# Patient Record
(demographics unavailable — no encounter records)

---

## 2025-04-27 NOTE — ASSESSMENT
[FreeTextEntry1] : Discussed about etiology of anemia during pregnancy including physiological, iron deficiency, hemolysis etc Discussed at length about iron deficiency- etiology, signs and symptoms, complications, and management options: oral vs IV Iron Symptoms: fatigue    I have reviewed the risks, benefits and side effects of IV iron with the patient to include but are not limited to infusion reaction, headaches, nausea. All questions were answered to satisfaction.   Patient agrees to pursue IV iron.   >CBC, iron studies, ferritin, B12 >Schedule IV venofer 200 mg x 5. >Repeat blood work including CBC, ferritin, iron studies in 5-6 weeks. >Further IV iron PRN for ferritin < 50

## 2025-04-27 NOTE — HISTORY OF PRESENT ILLNESS
[de-identified] : This is a 35 yo pregnant female coming to heme clinic for SINGH.  Pt is 30 weeks pregnant- VINAY 25.  Has been on oral iron since early pregnancy- kalia well.   Pertinent labs from 25 Ferritin- 17 (was 24 on 24) HGB 9.7 HCT 28.6 RBC 3.23  - first birth uncomplicated.  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

## 2025-04-27 NOTE — REASON FOR VISIT
[Initial Consultation] : an initial consultation for [FreeTextEntry2] : Anemia  [Interpreters_IDNumber] : [id number] [Interpreters_FullName] : Tami  [TWNoteComboBox1] : Congolese

## 2025-04-27 NOTE — PHYSICAL EXAM
[Fully active, able to carry on all pre-disease performance without restriction] : Status 0 - Fully active, able to carry on all pre-disease performance without restriction [Normal] : affect appropriate [de-identified] : _+gravid

## 2025-04-27 NOTE — PHYSICAL EXAM
[Fully active, able to carry on all pre-disease performance without restriction] : Status 0 - Fully active, able to carry on all pre-disease performance without restriction [Normal] : affect appropriate [de-identified] : _+gravid

## 2025-04-27 NOTE — PHYSICAL EXAM
[Fully active, able to carry on all pre-disease performance without restriction] : Status 0 - Fully active, able to carry on all pre-disease performance without restriction [Normal] : affect appropriate [de-identified] : _+gravid

## 2025-04-27 NOTE — REASON FOR VISIT
[Initial Consultation] : an initial consultation for [FreeTextEntry2] : Anemia  [Interpreters_IDNumber] : [id number] [Interpreters_FullName] : Tami  [TWNoteComboBox1] : Swedish

## 2025-04-27 NOTE — HISTORY OF PRESENT ILLNESS
[de-identified] : This is a 35 yo pregnant female coming to heme clinic for SINGH.  Pt is 30 weeks pregnant- VINAY 25.  Has been on oral iron since early pregnancy- kalia well.   Pertinent labs from 25 Ferritin- 17 (was 24 on 24) HGB 9.7 HCT 28.6 RBC 3.23  - first birth uncomplicated.  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

## 2025-04-27 NOTE — REASON FOR VISIT
[Initial Consultation] : an initial consultation for [FreeTextEntry2] : Anemia  [Interpreters_IDNumber] : [id number] [Interpreters_FullName] : Tami  [TWNoteComboBox1] : Burmese

## 2025-04-27 NOTE — HISTORY OF PRESENT ILLNESS
[de-identified] : This is a 35 yo pregnant female coming to heme clinic for SINGH.  Pt is 30 weeks pregnant- VINAY 25.  Has been on oral iron since early pregnancy- kalia well.   Pertinent labs from 25 Ferritin- 17 (was 24 on 24) HGB 9.7 HCT 28.6 RBC 3.23  - first birth uncomplicated.  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